# Patient Record
Sex: FEMALE | Race: BLACK OR AFRICAN AMERICAN | NOT HISPANIC OR LATINO | Employment: FULL TIME | URBAN - METROPOLITAN AREA
[De-identification: names, ages, dates, MRNs, and addresses within clinical notes are randomized per-mention and may not be internally consistent; named-entity substitution may affect disease eponyms.]

---

## 2024-09-19 ENCOUNTER — OFFICE VISIT (OUTPATIENT)
Dept: URGENT CARE | Facility: CLINIC | Age: 29
End: 2024-09-19
Payer: COMMERCIAL

## 2024-09-19 VITALS
HEART RATE: 65 BPM | BODY MASS INDEX: 30.61 KG/M2 | WEIGHT: 195 LBS | HEIGHT: 67 IN | RESPIRATION RATE: 16 BRPM | OXYGEN SATURATION: 98 % | TEMPERATURE: 97.8 F | SYSTOLIC BLOOD PRESSURE: 110 MMHG | DIASTOLIC BLOOD PRESSURE: 69 MMHG

## 2024-09-19 DIAGNOSIS — H10.32 ACUTE BACTERIAL CONJUNCTIVITIS OF LEFT EYE: ICD-10-CM

## 2024-09-19 DIAGNOSIS — H10.32 ACUTE CONJUNCTIVITIS OF LEFT EYE, UNSPECIFIED ACUTE CONJUNCTIVITIS TYPE: Primary | ICD-10-CM

## 2024-09-19 DIAGNOSIS — T88.7XXA MEDICATION SIDE EFFECT: ICD-10-CM

## 2024-09-19 PROCEDURE — 99212 OFFICE O/P EST SF 10 MIN: CPT | Performed by: PHYSICIAN ASSISTANT

## 2024-09-19 RX ORDER — OLOPATADINE HYDROCHLORIDE 2 MG/ML
1 SOLUTION/ DROPS OPHTHALMIC DAILY
Qty: 5 ML | Refills: 0 | Status: SHIPPED | OUTPATIENT
Start: 2024-09-19 | End: 2024-09-21 | Stop reason: ALTCHOICE

## 2024-09-19 RX ORDER — ERYTHROMYCIN 5 MG/G
OINTMENT OPHTHALMIC
COMMUNITY
Start: 2024-09-16 | End: 2024-09-19 | Stop reason: SINTOL

## 2024-09-19 RX ORDER — TOBRAMYCIN 3 MG/ML
2 SOLUTION/ DROPS OPHTHALMIC EVERY 4 HOURS
Qty: 5 ML | Refills: 0 | Status: SHIPPED | OUTPATIENT
Start: 2024-09-19 | End: 2024-09-21 | Stop reason: WASHOUT

## 2024-09-19 RX ORDER — FLUOROMETHOLONE ACETATE 1 MG/ML
1 SUSPENSION/ DROPS OPHTHALMIC 4 TIMES DAILY
Qty: 5 ML | Refills: 0 | Status: SHIPPED | OUTPATIENT
Start: 2024-09-19 | End: 2024-09-21 | Stop reason: ALTCHOICE

## 2024-09-19 ASSESSMENT — VISUAL ACUITY: OU: 1

## 2024-09-19 NOTE — PROGRESS NOTES
[facility] URGENT CARE   HOOD NOTE:      Addendum:  I spoke with Umu via phone and then [employer] virtual chat today @ 13:10hrs, she explains her eye is continuing to drain, she has more erythema/conjunctiva inflammation & minimal visual changes. I've encouraged her to stop all eye drops & to begin oral antibiotics given persistence, I've even given the number to [facility] for a visit. I'll follow up with her this afternoon 9/21/24 to address her ongoing needs. Meds discontinued in chart and directed verbally & oral option sent to preferred pharmacy. Request of heat therapy with warm compress encouraged.    Consideration given to HSV ophthalmicus, if she's not able to see ophtho soon, I recommend staining be done & antiviral be provided if warranted.      Name: Umu Amezcua, 29 y.o.    CSN:[id number]   MRN:[id number]    PCP: No Assigned PCP Generic Provider, MD    ALL:    Allergies   Allergen Reactions    Erythromycin Base Rash     Used during eye conjunctival tx, caused significant redness/swelling & pain        History:    Chief Complaint: Conjunctivitis (Pink pigmentation in the left eye, eye has swelling and drainage.)    Encounter Date: 9/19/2024      HPI: The history was obtained from the patient. Umu is a 29 y.o. female, who presents with a chief complaint of Conjunctivitis (Pink pigmentation in the left eye, eye has swelling and drainage.) complaining that she has gotten worse left eye irritation and drainage and pain with itching and burning since treatment with erythromycin and some unknown topical drop.  She denies any visual disturbance, headache fever chills or ulcerated lesions on the eyelid.    She denies working in with metal, wood, denies grinding any objects, denies wearing contacts.  She works at [employer].    PMHx:    No past medical history on file.           Current Outpatient Medications   Medication Sig Dispense Refill    fluorometholone (Flarex) 0.1  % ophthalmic suspension Administer 1 drop into the left eye 4 times a day for 10 days. 5 mL 0    olopatadine (Pataday Once Daily Relief) 0.2 % ophthalmic solution Administer 1 drop into both eyes once daily. 5 mL 0    tobramycin (Tobrex) 0.3 % ophthalmic solution Administer 2 drops into the left eye every 4 hours for 7 days. 5 mL 0     No current facility-administered medications for this visit.         PMSx:  No past surgical history on file.    Fam Hx: No family history on file.    SOC. Hx:     Social History     Socioeconomic History    Marital status: Single     Spouse name: Not on file    Number of children: Not on file    Years of education: Not on file    Highest education level: Not on file   Occupational History    Not on file   Tobacco Use    Smoking status: Not on file    Smokeless tobacco: Not on file   Substance and Sexual Activity    Alcohol use: Not on file    Drug use: Not on file    Sexual activity: Not on file   Other Topics Concern    Not on file   Social History Narrative    Not on file     Social Determinants of Health     Financial Resource Strain: Not on file   Food Insecurity: Not on file   Transportation Needs: Not on file   Physical Activity: Not on file   Stress: Not on file   Social Connections: Not on file   Intimate Partner Violence: Not on file   Housing Stability: Not on file         Vitals:    09/19/24 0957   BP: 110/69   Pulse: 65   Resp: 16   Temp: 36.6 °C (97.8 °F)   SpO2: 98%     88.5 kg (195 lb)          Physical Exam  Vitals reviewed.   Constitutional:       Appearance: Normal appearance. She is normal weight.   HENT:      Head: Normocephalic and atraumatic.      Nose: Nose normal.      Mouth/Throat:      Mouth: Mucous membranes are moist.   Eyes:      General: Lids are normal. Lids are everted, no foreign bodies appreciated. Vision grossly intact. Gaze aligned appropriately. No allergic shiner, visual field deficit or scleral icterus.     Extraocular Movements:      Right eye:  No nystagmus.      Left eye: No nystagmus.      Conjunctiva/sclera:      Right eye: Right conjunctiva is not injected. No chemosis, exudate or hemorrhage.     Left eye: Left conjunctiva is injected. Chemosis and exudate present. No hemorrhage.     Pupils: Pupils are equal, round, and reactive to light.      Funduscopic exam:     Right eye: Red reflex present.         Left eye: No papilledema. Red reflex present.  Musculoskeletal:      Cervical back: Normal range of motion.   Lymphadenopathy:      Head:      Left side of head: No posterior auricular adenopathy.      Cervical: No cervical adenopathy.   Skin:     General: Skin is warm.      Capillary Refill: Capillary refill takes less than 2 seconds.   Neurological:      General: No focal deficit present.      Mental Status: She is alert.         ____________________________________________________________________    I did personally review Umu's past medical history, surgical history, social history, as well as family history (when relevant).  In this case, I also oversaw the her drug management by reviewing her medication list, allergy list, as well as the medications that I prescribed during the UC course and/or recommended as an out-patient (including possible OTC medications such as acetaminophen, NSAIDs , etc).    After reviewing the items above, I did look at previous medical documentation, such as recent hospitalizations, office visits, and/or recent consultations with PCP/specialist.                          SDOH:   Another factor that I considered in Umu's care was her Social Determinants of Health (SDOH). During this UC encounter, she did not have social determinants of health. Those SDOH influencing Umu's care are: none      _____________________________________________________________________      UC COURSE/MEDICAL DECISION MAKING:    Umu is a 29 y.o., who presents with a working diagnosis of   1. Acute conjunctivitis of left eye, unspecified  acute conjunctivitis type    2. Medication side effect     with a differential to include: Allergic conjunctivitis, bacterial viral conjunctivitis, corneal abrasion, secondary reaction to topical ointments    Current plan is to discontinue erythromycin base and then provide patient with tobramycin and olopatadine antihistamine/mast cell stabilizer.  Given the degree and significant discomfort she has and I will provide a steroid short-term use and follow her with some suggestions on care.  She was reassured, encouraged to wash hands, she was ultimately discharged.  Visual acuity was 20/20 in the office.    I have listed erythromycin base as a possible side effect/allergy to hers she has reacted abnormally to this treatment.        Rikki Deutsch PA-C   Advanced Practice Provider  [facility] URGENT CARE

## 2024-09-21 RX ORDER — SULFAMETHOXAZOLE AND TRIMETHOPRIM 800; 160 MG/1; MG/1
1 TABLET ORAL 2 TIMES DAILY
Qty: 6 TABLET | Refills: 0 | Status: SHIPPED | OUTPATIENT
Start: 2024-09-21 | End: 2024-09-24

## 2024-10-05 ENCOUNTER — OFFICE VISIT (OUTPATIENT)
Dept: URGENT CARE | Facility: CLINIC | Age: 29
End: 2024-10-05
Payer: COMMERCIAL

## 2024-10-05 VITALS
OXYGEN SATURATION: 97 % | TEMPERATURE: 97.8 F | RESPIRATION RATE: 16 BRPM | HEART RATE: 88 BPM | DIASTOLIC BLOOD PRESSURE: 88 MMHG | SYSTOLIC BLOOD PRESSURE: 125 MMHG

## 2024-10-05 DIAGNOSIS — S09.90XA INJURY OF HEAD, INITIAL ENCOUNTER: Primary | ICD-10-CM

## 2024-10-05 PROCEDURE — 99212 OFFICE O/P EST SF 10 MIN: CPT

## 2024-10-05 ASSESSMENT — VISUAL ACUITY: OU: 1
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
## 2025-03-21 ENCOUNTER — OFFICE VISIT (OUTPATIENT)
Dept: URGENT CARE | Facility: CLINIC | Age: 30
End: 2025-03-21
Payer: COMMERCIAL

## 2025-03-21 VITALS
RESPIRATION RATE: 15 BRPM | SYSTOLIC BLOOD PRESSURE: 120 MMHG | TEMPERATURE: 98 F | DIASTOLIC BLOOD PRESSURE: 82 MMHG | HEART RATE: 81 BPM | OXYGEN SATURATION: 100 %

## 2025-03-21 DIAGNOSIS — K13.0 MUCOCELE OF LOWER LIP: Primary | ICD-10-CM

## 2025-03-21 PROCEDURE — 2500000004 HC RX 250 GENERAL PHARMACY W/ HCPCS (ALT 636 FOR OP/ED): Performed by: PHYSICIAN ASSISTANT

## 2025-03-21 PROCEDURE — 99212 OFFICE O/P EST SF 10 MIN: CPT | Mod: 25 | Performed by: PHYSICIAN ASSISTANT

## 2025-03-21 PROCEDURE — 10160 PNXR ASPIR ABSC HMTMA BULLA: CPT | Performed by: PHYSICIAN ASSISTANT

## 2025-03-21 PROCEDURE — 2500000001 HC RX 250 WO HCPCS SELF ADMINISTERED DRUGS (ALT 637 FOR MEDICARE OP): Performed by: PHYSICIAN ASSISTANT

## 2025-03-21 RX ORDER — TRIAMCINOLONE ACETONIDE 40 MG/ML
1 INJECTION, SUSPENSION INTRA-ARTICULAR; INTRAMUSCULAR ONCE
Status: COMPLETED | OUTPATIENT
Start: 2025-03-21 | End: 2025-03-21

## 2025-03-21 RX ADMIN — TRIAMCINOLONE ACETONIDE 1 MG: 40 INJECTION, SUSPENSION INTRA-ARTICULAR; INTRAMUSCULAR at 14:25

## 2025-03-21 RX ADMIN — Medication 1 APPLICATION: at 15:24

## 2025-03-21 NOTE — LETTER
March 21, 2025     Patient: Umu Amezcua   YOB: 1995   Date of Visit: 3/21/2025       To Whom it May Concern:    Umu Amezcua was seen in my clinic on 3/21/2025. She may return to school on 03/24/25 .    If you have any questions or concerns, please don't hesitate to call.         Sincerely,          Rikki Deutsch PA-C        CC: No Recipients

## 2025-03-21 NOTE — PROGRESS NOTES
[facility] URGENT CARE   HOOD NOTE:      Name: Umu Amezcua, 29 y.o.    CSN:[id number]   MRN:[id number]    PCP: No Assigned PCP Generic Provider, MD    ALL:    Allergies   Allergen Reactions    Erythromycin Base Rash     Used during eye conjunctival tx, caused significant redness/swelling & pain     Topiramate Rash       History:    Chief Complaint: sore on lip (X 2 weeks)    Encounter Date: 3/21/2025      HPI: The history was obtained from the patient. Umu is a 29 y.o. female, who presents with a chief complaint of sore on lip (X 2 weeks) mentions the sores on the right lower inner lip, states that it is not getting any larger but she was able to pop it but it did return.  She is concerned that it could require some excision or additional treatment.    PMHx:    No past medical history on file.           No current outpatient medications on file.     No current facility-administered medications for this visit.         PMSx:  No past surgical history on file.    Fam Hx: No family history on file.    SOC. Hx:     Social History     Socioeconomic History    Marital status: Single     Spouse name: Not on file    Number of children: Not on file    Years of education: Not on file    Highest education level: Not on file   Occupational History    Not on file   Tobacco Use    Smoking status: Not on file    Smokeless tobacco: Not on file   Substance and Sexual Activity    Alcohol use: Not on file    Drug use: Not on file    Sexual activity: Not on file   Other Topics Concern    Not on file   Social History Narrative    Not on file     Social Drivers of Health     Financial Resource Strain: Not on file   Food Insecurity: Not on file   Transportation Needs: Not on file   Physical Activity: Not on file   Stress: Not on file   Social Connections: Not on file   Intimate Partner Violence: Not on file   Housing Stability: Not on file         Vitals:    03/21/25 1242   BP: 120/82   Pulse: 81   Resp: 15    Temp: 36.7 °C (98 °F)   SpO2: 100%                Physical Exam  Vitals reviewed.   Constitutional:       Appearance: Normal appearance.   HENT:      Head: Normocephalic and atraumatic.      Nose: Nose normal.      Mouth/Throat:      Mouth: Oral lesions (Right 5 mm x 1 cm mucocele) present.     Eyes:      Extraocular Movements: Extraocular movements intact.      Pupils: Pupils are equal, round, and reactive to light.   Cardiovascular:      Pulses: Normal pulses.   Pulmonary:      Effort: Pulmonary effort is normal.   Musculoskeletal:         General: Normal range of motion.   Neurological:      Mental Status: She is alert.           ____________________________________________________________________    I did personally review Umu's past medical history, surgical history, social history, as well as family history (when relevant).  In this case, I also oversaw the her drug management by reviewing her medication list, allergy list, as well as the medications that I prescribed during the UC course and/or recommended as an out-patient (including possible OTC medications such as acetaminophen, NSAIDs , etc).    After reviewing the items above, I did not look at previous medical documentation, such as recent hospitalizations, office visits, and/or recent consultations with PCP/specialist.                          SDOH:   Another factor that I considered in Umu's care was her Social Determinants of Health (SDOH). During this UC encounter, she did not have social determinants of health. Those SDOH influencing Umu's care are: none      _____________________________________________________________________      UC COURSE/MEDICAL DECISION MAKING:    Umu is a 29 y.o., who presents with a working diagnosis of   1. Mucocele of lower lip      Received 1 mL of Kenalog intralesional injection in the office, discussed treatment plan at bedside she may return in a week for another intralesional injection if this persist, she  may also see a dentist, or ENT for excision.    She opted for the intralesional injection, I did not feel like I needed to do a complete excision of this mucocele in the office I would prefer to do a conservative option if possible.    Rikki Deutsch PA-C   Advanced Practice Provider  [facility] URGENT CARE    Please note: While the patient may or may not have received printed discharge paperwork, all relevant medical findings, test results, and treatment details are accessible through the electronic medical record system. The patient is encouraged to review their chart via the patient portal for comprehensive information and follow-up instructions.